# Patient Record
Sex: FEMALE | Race: BLACK OR AFRICAN AMERICAN | NOT HISPANIC OR LATINO | URBAN - METROPOLITAN AREA
[De-identification: names, ages, dates, MRNs, and addresses within clinical notes are randomized per-mention and may not be internally consistent; named-entity substitution may affect disease eponyms.]

---

## 2020-01-20 ENCOUNTER — EMERGENCY (EMERGENCY)
Facility: HOSPITAL | Age: 25
LOS: 1 days | Discharge: HOME | End: 2020-01-20
Attending: EMERGENCY MEDICINE | Admitting: EMERGENCY MEDICINE
Payer: COMMERCIAL

## 2020-01-20 VITALS
SYSTOLIC BLOOD PRESSURE: 111 MMHG | OXYGEN SATURATION: 100 % | RESPIRATION RATE: 18 BRPM | HEART RATE: 88 BPM | DIASTOLIC BLOOD PRESSURE: 71 MMHG

## 2020-01-20 VITALS
RESPIRATION RATE: 16 BRPM | HEART RATE: 96 BPM | TEMPERATURE: 98 F | OXYGEN SATURATION: 100 % | SYSTOLIC BLOOD PRESSURE: 121 MMHG | WEIGHT: 137.79 LBS | DIASTOLIC BLOOD PRESSURE: 75 MMHG

## 2020-01-20 PROCEDURE — 71275 CT ANGIOGRAPHY CHEST: CPT

## 2020-01-20 PROCEDURE — 71275 CT ANGIOGRAPHY CHEST: CPT | Mod: 26

## 2020-01-20 PROCEDURE — 80053 COMPREHEN METABOLIC PANEL: CPT

## 2020-01-20 PROCEDURE — 36415 COLL VENOUS BLD VENIPUNCTURE: CPT

## 2020-01-20 PROCEDURE — 85025 COMPLETE CBC W/AUTO DIFF WBC: CPT

## 2020-01-20 PROCEDURE — 99284 EMERGENCY DEPT VISIT MOD MDM: CPT

## 2020-01-20 PROCEDURE — 84702 CHORIONIC GONADOTROPIN TEST: CPT

## 2020-01-20 PROCEDURE — 99284 EMERGENCY DEPT VISIT MOD MDM: CPT | Mod: 25

## 2020-01-20 NOTE — ED ADULT TRIAGE NOTE - OTHER COMPLAINTS
PMH PE. Patient stopped taking blood thinner in NOV/2018. She denies recent long travel, smoking or oral contraceptives.

## 2020-01-20 NOTE — ED PROVIDER NOTE - OBJECTIVE STATEMENT
24F PMH PE p/w SOB. Dx w/ PE 2018 after p/w cp, presumed 2/2 OCPs which she is no longer taking, completed 3 mos xarelto. A few days ago pt developed vague SOB/SHEEHAN, now completely resolved and asymptomatic. Did not feel like symptoms prior to PE. Went to heme Dr. Rendon who referred to ED for CTA to eval PE. Denies associated CP, NVD, lightheaded, diaphoresis, palpitations, cough/rhinorrhea, black/bloody stool, LE pain/swelling, focal weakness/numbness, recent travel/immobilization, abd pain, urinary complaints, f/c. No hormone use.

## 2020-01-20 NOTE — ED PROVIDER NOTE - CLINICAL SUMMARY MEDICAL DECISION MAKING FREE TEXT BOX
24F PMH PE p/w SOB. Dx w/ PE 2018 after p/w cp, presumed 2/2 OCPs which she is no longer taking, completed 3 mos xarelto. A few days ago pt developed vague SOB/SHEEHAN, now completely resolved and asymptomatic. Did not feel like symptoms prior to PE. Went to heme Dr. Rendon who referred to ED for CTA to eval PE. No other systemic symptoms. Vitals wnl, exam as above.  ddx: Unclear etiology. Very low suspicion for PE. clinically not ACS, tamponade, dissection, PTX, perf, myocarditis, mediastinitis.   CBC, cmp, CTA (given hx and primary heme's request). Will reassess.

## 2020-01-20 NOTE — ED CLERICAL - NS ED CLERK NOTE PRE-ARRIVAL INFORMATION; ADDITIONAL PRE-ARRIVAL INFORMATION
being sent in to R/O pulmonary embolism, pt had PE in the past when on the pill. Experiencing chest pain and shortness of breath

## 2020-01-20 NOTE — ED PROVIDER NOTE - DATE/TIME 1
"Chief Complaint   Patient presents with     Recheck Medication     Musculoskeletal Problem       Initial /74  Pulse 68  Temp 98.1  F (36.7  C) (Oral)  Wt 147 lb (66.7 kg)  SpO2 100%  BMI 26.46 kg/m2 Estimated body mass index is 26.46 kg/(m^2) as calculated from the following:    Height as of 4/12/18: 5' 2.5\" (1.588 m).    Weight as of this encounter: 147 lb (66.7 kg).  Medication Reconciliation: complete     Omar Patel MA      "
20-Jan-2020 20:29

## 2020-01-20 NOTE — ED PROVIDER NOTE - NSFOLLOWUPINSTRUCTIONS_ED_ALL_ED_FT
Can take tylenol 650mg or motrin 600mg (May cause stomach irritation - take with food and avoid prolonged use) every 6hrs as needed for pain mild pain.  Stay well hydrated.  Return for fevers, persistent vomit, uncontrolled pain, worsening breathing, worsening lightheaded.  Follow up with primary doctor within 1-2 days.     Shortness of breath    Shortness of breath (dyspnea) means you have trouble breathing and could indicate a medical problem. Causes include lung disease, heart disease, low amount of red blood cells (anemia), poor physical fitness, being overweight, smoking, etc. Your health care provider today may not be able to find a cause for your shortness of breath after your exam. In this case, it is important to have a follow-up exam with your primary care physician as instructed. If medicines were prescribed, take them as directed for the full length of time directed. Refrain from tobacco products.    SEEK IMMEDIATE MEDICAL CARE IF YOU HAVE ANY OF THE FOLLOWING SYMPTOMS: worsening shortness of breath, chest pain, back pain, abdominal pain, fever, coughing up blood, lightheadedness/dizziness.

## 2020-01-20 NOTE — ED ADULT NURSE NOTE - OBJECTIVE STATEMENT
24 year old F patient, A+OX3, ambulatory w steady gait presents to ED w c/o of SOB x1week.  States hx of PE in 2018, was on xarelto for 3 months.  States SOB is intermittent, worsening on exertion sent in by hematologist.  No distress noted, speaking full and clear sentences wo distress.  Denies chest pain.

## 2020-01-20 NOTE — ED PROVIDER NOTE - PATIENT PORTAL LINK FT
You can access the FollowMyHealth Patient Portal offered by [facility] by registering at the following website: [email or web address]. By joining [employer]’s FollowMyHealth portal, you will also be able to view your health information using other applications (apps) compatible with our system.

## 2020-01-20 NOTE — ED ADULT TRIAGE NOTE - CHIEF COMPLAINT QUOTE
Patient sent in by her hematologist for an episode of SOB and right sided chest discomfort about 1 week ago. Patient currently denies chest pain or SOB.

## 2020-01-20 NOTE — ED PROVIDER NOTE - PROGRESS NOTE DETAILS
[place]: labs grossly wnl. CT sub-optimal but no obvious PE. Pt asymptoamtic. Clinically not PE. LEft voicemail for dr. bowen. Clinically no indication for further emergent ED workup or hospitalization at this time. Comfortable for dc, outpt f/u.

## 2020-01-20 NOTE — ED ADULT NURSE NOTE - CHPI ED NUR SYMPTOMS NEG
no tingling/no chills/no decreased eating/drinking/no pain/no fever/no vomiting/no nausea/no dizziness/no weakness

## 2020-01-30 DIAGNOSIS — R06.02 SHORTNESS OF BREATH: ICD-10-CM
